# Patient Record
Sex: FEMALE | Race: WHITE
[De-identification: names, ages, dates, MRNs, and addresses within clinical notes are randomized per-mention and may not be internally consistent; named-entity substitution may affect disease eponyms.]

---

## 2021-03-30 PROBLEM — Z00.00 ENCOUNTER FOR PREVENTIVE HEALTH EXAMINATION: Status: ACTIVE | Noted: 2021-03-30

## 2021-04-05 ENCOUNTER — APPOINTMENT (OUTPATIENT)
Dept: ORTHOPEDIC SURGERY | Facility: CLINIC | Age: 56
End: 2021-04-05
Payer: COMMERCIAL

## 2021-04-05 ENCOUNTER — RESULT REVIEW (OUTPATIENT)
Age: 56
End: 2021-04-05

## 2021-04-05 ENCOUNTER — OUTPATIENT (OUTPATIENT)
Dept: OUTPATIENT SERVICES | Facility: HOSPITAL | Age: 56
LOS: 1 days | End: 2021-04-05
Payer: COMMERCIAL

## 2021-04-05 VITALS
DIASTOLIC BLOOD PRESSURE: 84 MMHG | BODY MASS INDEX: 25.9 KG/M2 | WEIGHT: 165 LBS | SYSTOLIC BLOOD PRESSURE: 124 MMHG | HEIGHT: 67 IN

## 2021-04-05 DIAGNOSIS — M24.131 OTHER ARTICULAR CARTILAGE DISORDERS, RIGHT WRIST: ICD-10-CM

## 2021-04-05 DIAGNOSIS — M25.539 PAIN IN UNSPECIFIED WRIST: ICD-10-CM

## 2021-04-05 DIAGNOSIS — M19.231 SECONDARY OSTEOARTHRITIS, RIGHT WRIST: ICD-10-CM

## 2021-04-05 DIAGNOSIS — Z80.9 FAMILY HISTORY OF MALIGNANT NEOPLASM, UNSPECIFIED: ICD-10-CM

## 2021-04-05 DIAGNOSIS — Z72.89 OTHER PROBLEMS RELATED TO LIFESTYLE: ICD-10-CM

## 2021-04-05 PROCEDURE — 99072 ADDL SUPL MATRL&STAF TM PHE: CPT

## 2021-04-05 PROCEDURE — 73100 X-RAY EXAM OF WRIST: CPT | Mod: 26,RT

## 2021-04-05 PROCEDURE — 73100 X-RAY EXAM OF WRIST: CPT

## 2021-04-05 PROCEDURE — 99204 OFFICE O/P NEW MOD 45 MIN: CPT

## 2021-04-05 RX ORDER — LEVOTHYROXINE SODIUM 50 UG/1
50 CAPSULE ORAL
Refills: 0 | Status: ACTIVE | COMMUNITY

## 2021-04-05 NOTE — DISCUSSION/SUMMARY
[de-identified] : Active 55 year old healthy female coming for complex second opinion on a wrist issue. 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 \par Dr Murphy in [place] has offerred no non operative Rx and suggested immediate surgery and the patient would like a second opinion and to know whether any non operative optons could get her at least through the summer. and she specifically reference PRP. \par Previous cortisone injection at the tfcc complex gave full temporary relief but has worn off in 4 weeks. \par I believ ehte Dx she has been given is correct . Surgery is certainly not emergent as she has been lead to believe but I believe ulnar shortening and TFCC debridement tor repair is appropriate and may be needed some day. \par I do believe prp or amniotic allograft (ISABELLE injection) may buy her time along with bracing however I would recommend she see Dr Parrish Tena to discuss is and get his opinion first, If he believes prp has a chance of success after he reviews our x-rays and her MRI he can move forward with that or advise her if he thinks she needs more imminent surgery. \par i will arrange that. \par She would also like to see me back regarding her knee down the road. \par \par \par

## 2021-04-05 NOTE — PHYSICAL EXAM
[de-identified] : Patient is well nourished, well-developed, in no acute distress, with appropriate mood and affect. The patient is oriented to time, place, and person. Gait evaluation does not reveal a limp. The skin examination does not reveal obvious lesions, lacerations, or ecchymosis.\par Right wrist tender over TFCC complex region and pain with psoitive shuck test. No pain or resisted dorsifelxion or ulnar deviation. \par No radial dorsal or volar pain. \par  [de-identified] : x-rays show positive ulnar variance and early OA of lunotriquetral area no acute changes or avn. \par MRI report and images reviewed by me today which do show posiive TFCC complex tear and changes consistent with the lunotriquetral dgeneration we noted on x-ray. \par

## 2021-04-05 NOTE — HISTORY OF PRESENT ILLNESS
[de-identified] : 54 y/o female presents for Right Wrist Pain. Patient states her pain began 6 months ago denying any prior injuries. She states through her daily activities she noted soreness with got sharper with time. She followed her care with a Hand Specialist, Dr. Derrell Murphy who obtained a xray and MRI. Dr. Murphy has recommended surgery due to carpal tunnel and ligament tears. She wishes to seek a second opinion and discuss non surgical options  which is what brings her in office today. \par I have informed her I am not a hand/wrist specialist but as they scheduled her here I will begin her evaluation discuss options and then refer her to Dr Parrish Tena for definitive treatment. . \par

## 2021-04-14 ENCOUNTER — APPOINTMENT (OUTPATIENT)
Dept: ORTHOPEDIC SURGERY | Facility: CLINIC | Age: 56
End: 2021-04-14